# Patient Record
Sex: MALE | Race: WHITE | NOT HISPANIC OR LATINO | URBAN - METROPOLITAN AREA
[De-identification: names, ages, dates, MRNs, and addresses within clinical notes are randomized per-mention and may not be internally consistent; named-entity substitution may affect disease eponyms.]

---

## 2020-06-18 PROBLEM — H40.023 GLAUCOMA SUSPECT, HIGH RISK: Noted: 2021-06-17

## 2020-06-18 PROBLEM — H52.4 PRESBYOPIA: Noted: 2021-06-17

## 2020-06-18 PROBLEM — Z96.1 PSEUDOPHAKIA: Noted: 2021-06-17

## 2021-06-17 ENCOUNTER — PREPPED CHART (OUTPATIENT)
Dept: URBAN - METROPOLITAN AREA CLINIC 34 | Facility: CLINIC | Age: 71
End: 2021-06-17

## 2021-06-17 PROBLEM — H52.4 PRESBYOPIA: Noted: 2021-06-17

## 2021-06-17 PROBLEM — E11.3293 MILD NONPROLIFERATIVE DIABETIC RETINOPATHY: Status: STABILIZING | Noted: 2021-06-17

## 2021-06-17 PROBLEM — H40.023 GLAUCOMA SUSPECT, HIGH RISK: Status: STABILIZING | Noted: 2021-06-17

## 2021-06-17 PROBLEM — Z96.1 PSEUDOPHAKIA: Noted: 2021-06-17

## 2022-01-24 ENCOUNTER — PREPPED CHART (OUTPATIENT)
Dept: URBAN - METROPOLITAN AREA CLINIC 49 | Facility: CLINIC | Age: 72
End: 2022-01-24

## 2022-01-24 PROBLEM — E10.3593 PROLIFERATIVE DIABETIC RETINOPATHY: Status: STABILIZING | Noted: 2022-01-24

## 2022-01-24 PROBLEM — H35.373 EPIRETINAL MEMBRANE: Status: STABILIZING | Noted: 2022-01-24

## 2022-01-24 PROBLEM — H26.491 MILD POSTERIOR CAPSULAR HAZE: Status: DETERIORATING | Noted: 2022-01-24

## 2022-01-24 PROBLEM — H26.493 MILD POSTERIOR CAPSULAR HAZE: Noted: 2022-01-24

## 2022-01-24 PROBLEM — E10.3593 PROLIFERATIVE DIABETIC RETINOPATHY: Noted: 2022-01-24

## 2022-01-24 PROBLEM — H26.493 MILD POSTERIOR CAPSULAR HAZE: Status: DETERIORATING | Noted: 2022-01-24

## 2022-01-24 PROBLEM — H26.493 PCO NOT OBSCURING VISION: Noted: 2022-01-24

## 2022-01-24 PROBLEM — H35.373 EPIRETINAL MEMBRANE: Noted: 2022-01-24

## 2022-08-30 ENCOUNTER — COMPLETE EYE EXAM (OUTPATIENT)
Dept: URBAN - METROPOLITAN AREA CLINIC 34 | Facility: CLINIC | Age: 72
End: 2022-08-30

## 2022-08-30 DIAGNOSIS — Z96.1: ICD-10-CM

## 2022-08-30 DIAGNOSIS — H26.493: ICD-10-CM

## 2022-08-30 DIAGNOSIS — H35.373: ICD-10-CM

## 2022-08-30 DIAGNOSIS — E11.3293: ICD-10-CM

## 2022-08-30 DIAGNOSIS — H40.023: ICD-10-CM

## 2022-08-30 DIAGNOSIS — H52.4: ICD-10-CM

## 2022-08-30 PROCEDURE — 92015 DETERMINE REFRACTIVE STATE: CPT | Mod: GY

## 2022-08-30 PROCEDURE — 92134 CPTRZ OPH DX IMG PST SGM RTA: CPT | Mod: NC

## 2022-08-30 PROCEDURE — 92083 EXTENDED VISUAL FIELD XM: CPT

## 2022-08-30 PROCEDURE — 92014 COMPRE OPH EXAM EST PT 1/>: CPT

## 2022-08-30 PROCEDURE — 92133 CPTRZD OPH DX IMG PST SGM ON: CPT

## 2022-08-30 ASSESSMENT — VISUAL ACUITY
OD_SC: 20/30
OS_SC: 20/100
OU_CC: J1

## 2022-08-30 ASSESSMENT — TONOMETRY
OD_IOP_MMHG: 24
OS_IOP_MMHG: 24

## 2022-12-29 ASSESSMENT — TONOMETRY
OD_IOP_MMHG: 21
OS_IOP_MMHG: 21

## 2022-12-29 ASSESSMENT — VISUAL ACUITY
OS_SC: 20/50-2
OD_SC: 20/25

## 2023-01-26 ENCOUNTER — FOLLOW UP (OUTPATIENT)
Dept: URBAN - METROPOLITAN AREA CLINIC 49 | Facility: CLINIC | Age: 73
End: 2023-01-26

## 2023-01-26 DIAGNOSIS — E10.3593: ICD-10-CM

## 2023-01-26 DIAGNOSIS — H35.373: ICD-10-CM

## 2023-01-26 DIAGNOSIS — H26.493: ICD-10-CM

## 2023-01-26 PROCEDURE — 92014 COMPRE OPH EXAM EST PT 1/>: CPT

## 2023-01-26 PROCEDURE — 92202 OPSCPY EXTND ON/MAC DRAW: CPT

## 2023-01-26 PROCEDURE — 92134 CPTRZ OPH DX IMG PST SGM RTA: CPT

## 2023-01-26 ASSESSMENT — TONOMETRY
OS_IOP_MMHG: 17
OD_IOP_MMHG: 13

## 2023-01-26 ASSESSMENT — VISUAL ACUITY
OD_SC: 20/20-2
OS_SC: 20/60

## 2023-10-25 ENCOUNTER — ESTABLISHED COMPREHENSIVE EXAM (OUTPATIENT)
Dept: URBAN - METROPOLITAN AREA CLINIC 34 | Facility: CLINIC | Age: 73
End: 2023-10-25

## 2023-10-25 DIAGNOSIS — H52.4: ICD-10-CM

## 2023-10-25 DIAGNOSIS — H52.02: ICD-10-CM

## 2023-10-25 DIAGNOSIS — E10.3593: ICD-10-CM

## 2023-10-25 DIAGNOSIS — H35.373: ICD-10-CM

## 2023-10-25 DIAGNOSIS — H52.203: ICD-10-CM

## 2023-10-25 DIAGNOSIS — H40.023: ICD-10-CM

## 2023-10-25 DIAGNOSIS — H52.11: ICD-10-CM

## 2023-10-25 DIAGNOSIS — H26.493: ICD-10-CM

## 2023-10-25 PROCEDURE — 92015 DETERMINE REFRACTIVE STATE: CPT | Mod: NC

## 2023-10-25 PROCEDURE — 99214 OFFICE O/P EST MOD 30 MIN: CPT

## 2023-10-25 PROCEDURE — 92133 CPTRZD OPH DX IMG PST SGM ON: CPT

## 2023-10-25 ASSESSMENT — TONOMETRY
OD_IOP_MMHG: 14
OS_IOP_MMHG: 15

## 2023-10-25 ASSESSMENT — VISUAL ACUITY
OD_SC: 20/30
OS_SC: 20/60

## 2024-04-24 ENCOUNTER — 6 MONTH FOLLOW UP (OUTPATIENT)
Dept: URBAN - METROPOLITAN AREA CLINIC 34 | Facility: CLINIC | Age: 74
End: 2024-04-24

## 2024-04-24 DIAGNOSIS — H35.373: ICD-10-CM

## 2024-04-24 DIAGNOSIS — H40.023: ICD-10-CM

## 2024-04-24 DIAGNOSIS — E10.3593: ICD-10-CM

## 2024-04-24 DIAGNOSIS — H26.493: ICD-10-CM

## 2024-04-24 PROCEDURE — 99213 OFFICE O/P EST LOW 20 MIN: CPT

## 2024-04-24 PROCEDURE — 92133 CPTRZD OPH DX IMG PST SGM ON: CPT

## 2024-04-24 PROCEDURE — 92083 EXTENDED VISUAL FIELD XM: CPT

## 2024-04-24 ASSESSMENT — TONOMETRY
OD_IOP_MMHG: 18
OS_IOP_MMHG: 18

## 2024-04-24 ASSESSMENT — VISUAL ACUITY
OD_SC: 20/25
OS_SC: 20/70-1

## 2024-08-15 ENCOUNTER — APPOINTMENT (RX ONLY)
Dept: URBAN - METROPOLITAN AREA CLINIC 41 | Facility: CLINIC | Age: 74
Setting detail: DERMATOLOGY
End: 2024-08-15

## 2024-08-15 DIAGNOSIS — D22 MELANOCYTIC NEVI: ICD-10-CM | Status: STABLE

## 2024-08-15 DIAGNOSIS — L57.8 OTHER SKIN CHANGES DUE TO CHRONIC EXPOSURE TO NONIONIZING RADIATION: ICD-10-CM | Status: STABLE

## 2024-08-15 DIAGNOSIS — D18.0 HEMANGIOMA: ICD-10-CM | Status: STABLE

## 2024-08-15 DIAGNOSIS — Z00.00 ENCOUNTER FOR GENERAL ADULT MEDICAL EXAMINATION WITHOUT ABNORMAL FINDINGS: ICD-10-CM | Status: STABLE

## 2024-08-15 DIAGNOSIS — L85.3 XEROSIS CUTIS: ICD-10-CM | Status: STABLE

## 2024-08-15 DIAGNOSIS — L82.1 OTHER SEBORRHEIC KERATOSIS: ICD-10-CM | Status: STABLE

## 2024-08-15 PROBLEM — D18.01 HEMANGIOMA OF SKIN AND SUBCUTANEOUS TISSUE: Status: ACTIVE | Noted: 2024-08-15

## 2024-08-15 PROBLEM — D22.5 MELANOCYTIC NEVI OF TRUNK: Status: ACTIVE | Noted: 2024-08-15

## 2024-08-15 PROCEDURE — ? TREATMENT REGIMEN

## 2024-08-15 PROCEDURE — ? FULL BODY SKIN EXAM

## 2024-08-15 PROCEDURE — 99203 OFFICE O/P NEW LOW 30 MIN: CPT

## 2024-08-15 PROCEDURE — ? COUNSELING

## 2024-08-15 ASSESSMENT — LOCATION DETAILED DESCRIPTION DERM
LOCATION DETAILED: LEFT INFERIOR MEDIAL MIDBACK
LOCATION DETAILED: RIGHT PROXIMAL PRETIBIAL REGION
LOCATION DETAILED: INFERIOR MID FOREHEAD
LOCATION DETAILED: LEFT DISTAL PRETIBIAL REGION
LOCATION DETAILED: RIGHT SUPERIOR MEDIAL MIDBACK
LOCATION DETAILED: RIGHT POSTERIOR SHOULDER
LOCATION DETAILED: INFERIOR THORACIC SPINE
LOCATION DETAILED: LEFT POSTERIOR SHOULDER
LOCATION DETAILED: RIGHT MID-UPPER BACK
LOCATION DETAILED: RIGHT ANTERIOR SCROTUM

## 2024-08-15 ASSESSMENT — LOCATION SIMPLE DESCRIPTION DERM
LOCATION SIMPLE: SCROTUM
LOCATION SIMPLE: LEFT PRETIBIAL REGION
LOCATION SIMPLE: RIGHT UPPER BACK
LOCATION SIMPLE: LEFT SHOULDER
LOCATION SIMPLE: RIGHT PRETIBIAL REGION
LOCATION SIMPLE: LEFT LOWER BACK
LOCATION SIMPLE: INFERIOR FOREHEAD
LOCATION SIMPLE: RIGHT LOWER BACK
LOCATION SIMPLE: RIGHT SHOULDER
LOCATION SIMPLE: UPPER BACK

## 2024-08-15 ASSESSMENT — LOCATION ZONE DERM
LOCATION ZONE: GENITALIA
LOCATION ZONE: FACE
LOCATION ZONE: LEG
LOCATION ZONE: ARM
LOCATION ZONE: TRUNK

## 2024-08-15 NOTE — HPI: EVALUATION OF SKIN LESION(S)
Hpi Title: Evaluation of Skin Lesions
Additional History: New pt\\nFamily hx (uncle  of melanoma)\\nSpot in crotch that is irritated and draining blood, has used gold bond powder\\n

## 2024-08-15 NOTE — PROCEDURE: COUNSELING
Sunscreen Recommendations: spf 30+
Detail Level: Generalized
Detail Level: Zone
Patient Specific Counseling (Will Not Stick From Patient To Patient): -\\Pernell Celis counsels that there is no abnormalities and that he recommends pt have a washcloth soaking the area 5-10 mins daily to soften feces so that when it comes off it does not cause irritation or bleeding. Dr. Celis counsels to continue use of powder.
Detail Level: Detailed

## 2024-10-25 ENCOUNTER — ESTABLISHED COMPREHENSIVE EXAM (OUTPATIENT)
Dept: URBAN - METROPOLITAN AREA CLINIC 34 | Facility: CLINIC | Age: 74
End: 2024-10-25

## 2024-10-25 DIAGNOSIS — E10.3593: ICD-10-CM

## 2024-10-25 DIAGNOSIS — H35.373: ICD-10-CM

## 2024-10-25 DIAGNOSIS — H40.023: ICD-10-CM

## 2024-10-25 DIAGNOSIS — H26.493: ICD-10-CM

## 2024-10-25 PROCEDURE — 92134 CPTRZ OPH DX IMG PST SGM RTA: CPT | Mod: NC

## 2024-10-25 PROCEDURE — 99214 OFFICE O/P EST MOD 30 MIN: CPT

## 2024-10-25 PROCEDURE — 92083 EXTENDED VISUAL FIELD XM: CPT

## 2024-10-25 PROCEDURE — 92133 CPTRZD OPH DX IMG PST SGM ON: CPT

## 2024-10-25 ASSESSMENT — VISUAL ACUITY
OS_SC: 20/100
OD_SC: 20/25

## 2024-10-25 ASSESSMENT — TONOMETRY
OS_IOP_MMHG: 15
OD_IOP_MMHG: 18

## 2024-11-07 ENCOUNTER — CLINIC PROCEDURE ONLY (OUTPATIENT)
Dept: URBAN - METROPOLITAN AREA CLINIC 93 | Facility: CLINIC | Age: 74
End: 2024-11-07

## 2024-11-07 DIAGNOSIS — H26.493: ICD-10-CM

## 2024-11-07 PROCEDURE — 66821 AFTER CATARACT LASER SURGERY: CPT

## 2024-11-07 ASSESSMENT — TONOMETRY: OS_IOP_MMHG: 22

## 2024-11-07 ASSESSMENT — VISUAL ACUITY: OS_SC: 20/100

## 2024-11-27 ENCOUNTER — 1 WEEK POST-OP (OUTPATIENT)
Dept: URBAN - METROPOLITAN AREA CLINIC 34 | Facility: CLINIC | Age: 74
End: 2024-11-27

## 2024-11-27 DIAGNOSIS — Z96.1: ICD-10-CM

## 2024-11-27 PROCEDURE — 99024 POSTOP FOLLOW-UP VISIT: CPT

## 2024-11-27 ASSESSMENT — TONOMETRY: OS_IOP_MMHG: 19

## 2024-11-27 ASSESSMENT — VISUAL ACUITY
OS_PH: 20/30
OS_SC: 20/40
OD_SC: 20/25